# Patient Record
Sex: MALE | Race: WHITE
[De-identification: names, ages, dates, MRNs, and addresses within clinical notes are randomized per-mention and may not be internally consistent; named-entity substitution may affect disease eponyms.]

---

## 2019-12-04 ENCOUNTER — HOSPITAL ENCOUNTER (INPATIENT)
Dept: HOSPITAL 74 - YASAS | Age: 24
LOS: 2 days | Discharge: LEFT BEFORE DISCHARGE | DRG: 770 | End: 2019-12-06
Attending: NEUROMUSCULOSKELETAL MEDICINE & OMM | Admitting: NEUROMUSCULOSKELETAL MEDICINE & OMM
Payer: COMMERCIAL

## 2019-12-04 VITALS — BODY MASS INDEX: 27 KG/M2

## 2019-12-04 DIAGNOSIS — Z86.19: ICD-10-CM

## 2019-12-04 DIAGNOSIS — Z98.84: ICD-10-CM

## 2019-12-04 DIAGNOSIS — F15.20: Primary | ICD-10-CM

## 2019-12-04 PROCEDURE — HZ42ZZZ GROUP COUNSELING FOR SUBSTANCE ABUSE TREATMENT, COGNITIVE-BEHAVIORAL: ICD-10-PCS | Performed by: NEUROMUSCULOSKELETAL MEDICINE & OMM

## 2019-12-04 PROCEDURE — G0008 ADMIN INFLUENZA VIRUS VAC: HCPCS

## 2019-12-04 RX ADMIN — Medication SCH MG: at 21:28

## 2019-12-04 NOTE — HP
CIWA Score





- Admission Criteria


[facility] Guidelines: Admission for Medically Managed Detox: 


Requires at least one of the followin. CIWA greater than 12


2. Seizures within the past 24 hours


3. Delirium tremens within the past 24 hours


4. Hallucinations within the past 24 hours


5. Acute intervention needed for co  occurring medical disorder


6. Acute intervention needed for co  occurring psychiatric disorder


7. Severe withdrawal that cannot be handled at a lower level of care (continued


    vomiting, continued diarrhea, abnormal vital signs) requiring intravenous


    medication and/or fluids


8. Pregnancy








Admission ROS [facility]





- HPI


Chief Complaint: 





rehab from for crystal meth use


Allergies/Adverse Reactions: 


 Allergies











Allergy/AdvReac Type Severity Reaction Status Date / Time


 


No Known Allergies Allergy   Verified 19 15:39














- Ebola screening


Have you traveled outside of the country in the last 21 days: No


Have you had contact with anyone from an Ebola affected area: No


Do you have a fever: No





- Review of Systems


Constitutional: No Symptoms Reported


EENT: reports: No Symptoms Reported


Respiratory: reports: No Symptoms reported


Cardiac: reports: No Symptoms Reported


GI: reports: No Symptoms Reported


: reports: No Symptoms Reported


Musculoskeletal: reports: No Symptoms Reported


Integumentary: reports: No Symptoms Reported


Neuro: reports: No Symptoms reported


Endocrine: reports: No Symptoms Reported


Hematology: reports: No Symptoms Reported


Psychiatric: reports: No Sypmtoms Reported


Other Systems: Reviewed and Negative





Patient History





- Patient Medical History


Hx Human Immunodeficiency Virus (HIV): No (tested HIV neg)





- Patient Surgical History


Hx Abdominal Surgery: Yes (gastric sleeve)





- Smoking Cessation


Smoking history: Never smoked





- Substance & Tx. History


Hx Alcohol Use: Yes


Hx Substance Use: Yes


Hx Substance Use Treatment: Yes





- Substances abused


  ** Crystal meth


Substance route: Smoking


Frequency: Daily


Amount used: 1 BAG $120


Age of first use: 20


Date of last use: 18





Admission Physical Exam [facility]





- Vital Signs


Vital Signs: 


 Vital Signs - 24 hr











  19





  15:35


 


Temperature 99.3 F


 


Pulse Rate 98 H


 


Respiratory 18





Rate 


 


Blood Pressure 138/80














Breathalyzer





- Breathalyzer


Breathalyzer: 0





Urine Drug Screen





- Test Device


Lot number: [id number]


Expiration date: 21





- Control


Is test valid?: Yes





- Results


Drug screen NEGATIVE: No


Urine drug screen results: MET-Methamphetamine, BZO-Benzodiazepines





Inpatient Rehab Admission





- Rehab Decision to Admit


Inpatient rehab admission?: Yes





- Initial Determination


Are CD services needed?: Yes


Free of communicable disease: Yes


Not in need of hospitalization: Yes





- Rehab Admission Criteria


Previous failed treatment: Yes


Poor recovery environment: Yes


Comorbidities: Yes


Lacks judgement: Yes


Patient is meeting Inpatient Rehab admission criteria:: Yes (long h/o crystal 

meth use-tried to stop several times)

## 2019-12-05 VITALS — TEMPERATURE: 97.2 F

## 2019-12-05 LAB
ALBUMIN SERPL-MCNC: 3 G/DL (ref 3.4–5)
ALP SERPL-CCNC: 81 U/L (ref 45–117)
ALT SERPL-CCNC: 18 U/L (ref 13–61)
ANION GAP SERPL CALC-SCNC: 5 MMOL/L (ref 8–16)
APPEARANCE UR: (no result)
AST SERPL-CCNC: 11 U/L (ref 15–37)
BACTERIA # UR AUTO: 5.3 /HPF
BILIRUB SERPL-MCNC: 0.4 MG/DL (ref 0.2–1)
BILIRUB UR STRIP.AUTO-MCNC: NEGATIVE MG/DL
BUN SERPL-MCNC: 13.7 MG/DL (ref 7–18)
CALCIUM SERPL-MCNC: 8.7 MG/DL (ref 8.5–10.1)
CASTS URNS QL MICRO: 2076.84 /LPF (ref 0–8)
CHLORIDE SERPL-SCNC: 108 MMOL/L (ref 98–107)
CO2 SERPL-SCNC: 29 MMOL/L (ref 21–32)
COLOR UR: YELLOW
CREAT SERPL-MCNC: 0.6 MG/DL (ref 0.55–1.3)
CRYSTALS URNS QL MICRO: 4 /HPF
DEPRECATED RDW RBC AUTO: 14.2 % (ref 11.9–15.9)
EPITH CASTS URNS QL MICRO: 43.3 /HPF
GLUCOSE SERPL-MCNC: 81 MG/DL (ref 74–106)
HCT VFR BLD CALC: 37.5 % (ref 35.4–49)
HGB BLD-MCNC: 12.6 GM/DL (ref 11.7–16.9)
KETONES UR QL STRIP: NEGATIVE
LEUKOCYTE ESTERASE UR QL STRIP.AUTO: NEGATIVE
MCH RBC QN AUTO: 31.4 PG (ref 25.7–33.7)
MCHC RBC AUTO-ENTMCNC: 33.7 G/DL (ref 32–35.9)
MCV RBC: 93.1 FL (ref 80–96)
NITRITE UR QL STRIP: NEGATIVE
PH UR: 6.5 [PH] (ref 5–8)
PLATELET # BLD AUTO: 296 K/MM3 (ref 134–434)
PMV BLD: 7.7 FL (ref 7.5–11.1)
POTASSIUM SERPLBLD-SCNC: 4.3 MMOL/L (ref 3.5–5.1)
PROT SERPL-MCNC: 5.8 G/DL (ref 6.4–8.2)
PROT UR QL STRIP: NEGATIVE
PROT UR QL STRIP: NEGATIVE
RBC # BLD AUTO: 13.4 /HPF (ref 0–4)
RBC # BLD AUTO: 4.03 M/MM3 (ref 4–5.6)
SODIUM SERPL-SCNC: 141 MMOL/L (ref 136–145)
SP GR UR: 1.02 (ref 1.01–1.03)
UROBILINOGEN UR STRIP-MCNC: 0.2 MG/DL (ref 0.2–1)
WBC # BLD AUTO: 7.7 K/MM3 (ref 4–10)
WBC # UR AUTO: 14.8 /HPF (ref 0–5)

## 2019-12-05 RX ADMIN — Medication SCH TAB: at 10:02

## 2019-12-05 RX ADMIN — Medication SCH MG: at 21:29

## 2019-12-05 NOTE — PN
[facility] Progress Note


Note: 





Pt states that melatonin made him sleepy. No other complaints


 Vital Signs - 24 hr











  12/04/19 12/05/19 12/05/19





  15:35 00:30 03:30


 


Temperature 99.3 F  


 


Pulse Rate 98 H  


 


Respiratory 18 18 18





Rate   


 


Blood Pressure 138/80  














  12/05/19





  06:58


 


Temperature 97.2 F L


 


Pulse Rate 79


 


Respiratory 18





Rate 


 


Blood Pressure 118/80








 Laboratory Tests











  12/05/19





  08:00


 


Urine Color  Yellow


 


Urine Appearance  Coudy


 


Urine pH  6.5


 


Ur Specific Gravity  1.022


 


Urine Protein  Negative


 


Urine Glucose (UA)  Negative


 


Urine Ketones  Negative


 


Urine Blood  Negative


 


Urine Nitrite  Negative


 


Urine Bilirubin  Negative


 


Urine Urobilinogen  0.2


 


Ur Leukocyte Esterase  Negative


 


Urine WBC (Auto)  14.8


 


Urine RBC (Auto)  13.4


 


Urine Casts (Auto)  2076.84


 


U Pathogenic Cast Auto  None seen


 


U Epithel Cells (Auto)  43.3


 


U Sm Round Cell (Auto)  None seen


 


Urine Crystals (Auto)  4


 


Urine Bacteria (Auto)  5.3








a/p MUD: continue supportive care


melatonin prn

## 2019-12-06 VITALS — HEART RATE: 98 BPM | DIASTOLIC BLOOD PRESSURE: 70 MMHG | SYSTOLIC BLOOD PRESSURE: 126 MMHG

## 2019-12-06 LAB
RPR: (no result)
TREPONEMA ANTIBODY: REACTIVE

## 2019-12-06 RX ADMIN — Medication SCH TAB: at 09:54

## 2019-12-06 NOTE — PN
[facility] Progress Note


Note: 





Patient seen for +MHA and RPR 1:2. Patient was admitted to [facility] one week 

ago. Facility called to verify lab results:  RPR 1:16, MHA positive. Patient 

reports being treated for Syphilis at [facility] 3 weeks ago with PCN injection. 

Patient asymptomatic, no further treatment warranted. 


 Vital Signs











Temperature  97.2 F L  12/06/19 06:29


 


Pulse Rate  98 H  12/06/19 06:29


 


Respiratory Rate  18   12/06/19 06:29


 


Blood Pressure  126/70   12/06/19 06:29


 


O2 Sat by Pulse Oximetry (%)      








 Laboratory Tests











  12/05/19 12/05/19 12/05/19





  08:00 08:15 08:15


 


WBC   7.7 


 


RBC   4.03 


 


Hgb   12.6 


 


Hct   37.5 


 


MCV   93.1 


 


MCH   31.4 


 


MCHC   33.7 


 


RDW   14.2 


 


Plt Count   296 


 


MPV   7.7 


 


Sodium    141


 


Potassium    4.3


 


Chloride    108 H


 


Carbon Dioxide    29


 


Anion Gap    5 L


 


BUN    13.7


 


Creatinine    0.6


 


Est GFR (CKD-EPI)AfAm    161.96


 


Est GFR (CKD-EPI)NonAf    139.74


 


Random Glucose    81


 


Calcium    8.7


 


Total Bilirubin    0.4


 


AST    11 L


 


ALT    18


 


Alkaline Phosphatase    81


 


Total Protein    5.8 L


 


Albumin    3.0 L


 


Urine Color  Yellow  


 


Urine Appearance  Coudy  


 


Urine pH  6.5  


 


Ur Specific Gravity  1.022  


 


Urine Protein  Negative  


 


Urine Glucose (UA)  Negative  


 


Urine Ketones  Negative  


 


Urine Blood  Negative  


 


Urine Nitrite  Negative  


 


Urine Bilirubin  Negative  


 


Urine Urobilinogen  0.2  


 


Ur Leukocyte Esterase  Negative  


 


Urine WBC (Auto)  14.8  


 


Urine RBC (Auto)  13.4  


 


Urine Casts (Auto)  2076.84  


 


U Pathogenic Cast Auto  None seen  


 


U Epithel Cells (Auto)  43.3  


 


U Sm Round Cell (Auto)  None seen  


 


Urine Crystals (Auto)  4  


 


Urine Bacteria (Auto)  5.3  


 


RPR Titer   


 


T.pallidum Ab (MHA)   


 


HIV 1&2 Antibody Screen   


 


HIV P24 Antigen   














  12/05/19 12/05/19





  08:15 08:15


 


WBC  


 


RBC  


 


Hgb  


 


Hct  


 


MCV  


 


MCH  


 


MCHC  


 


RDW  


 


Plt Count  


 


MPV  


 


Sodium  


 


Potassium  


 


Chloride  


 


Carbon Dioxide  


 


Anion Gap  


 


BUN  


 


Creatinine  


 


Est GFR (CKD-EPI)AfAm  


 


Est GFR (CKD-EPI)NonAf  


 


Random Glucose  


 


Calcium  


 


Total Bilirubin  


 


AST  


 


ALT  


 


Alkaline Phosphatase  


 


Total Protein  


 


Albumin  


 


Urine Color  


 


Urine Appearance  


 


Urine pH  


 


Ur Specific Gravity  


 


Urine Protein  


 


Urine Glucose (UA)  


 


Urine Ketones  


 


Urine Blood  


 


Urine Nitrite  


 


Urine Bilirubin  


 


Urine Urobilinogen  


 


Ur Leukocyte Esterase  


 


Urine WBC (Auto)  


 


Urine RBC (Auto)  


 


Urine Casts (Auto)  


 


U Pathogenic Cast Auto  


 


U Epithel Cells (Auto)  


 


U Sm Round Cell (Auto)  


 


Urine Crystals (Auto)  


 


Urine Bacteria (Auto)  


 


RPR Titer  Reactive 1:2 H 


 


T.pallidum Ab (MHA)  Reactive 


 


HIV 1&2 Antibody Screen   Negative


 


HIV P24 Antigen   Negative